# Patient Record
Sex: MALE | Race: WHITE | NOT HISPANIC OR LATINO | Employment: FULL TIME | URBAN - NONMETROPOLITAN AREA
[De-identification: names, ages, dates, MRNs, and addresses within clinical notes are randomized per-mention and may not be internally consistent; named-entity substitution may affect disease eponyms.]

---

## 2024-06-05 ENCOUNTER — APPOINTMENT (OUTPATIENT)
Dept: URGENT CARE | Facility: PHYSICIAN GROUP | Age: 44
End: 2024-06-05
Payer: COMMERCIAL

## 2024-06-12 ENCOUNTER — OFFICE VISIT (OUTPATIENT)
Dept: MEDICAL GROUP | Facility: PHYSICIAN GROUP | Age: 44
End: 2024-06-12
Payer: COMMERCIAL

## 2024-06-12 VITALS
SYSTOLIC BLOOD PRESSURE: 112 MMHG | TEMPERATURE: 98.8 F | HEART RATE: 87 BPM | HEIGHT: 72 IN | OXYGEN SATURATION: 95 % | DIASTOLIC BLOOD PRESSURE: 70 MMHG | BODY MASS INDEX: 28.31 KG/M2 | WEIGHT: 209 LBS

## 2024-06-12 DIAGNOSIS — Z86.14 HISTORY OF MRSA INFECTION: ICD-10-CM

## 2024-06-12 DIAGNOSIS — R17 HIGH BILIRUBIN: ICD-10-CM

## 2024-06-12 DIAGNOSIS — Z87.898 HISTORY OF SYNCOPE: ICD-10-CM

## 2024-06-12 DIAGNOSIS — Z76.89 ENCOUNTER TO ESTABLISH CARE: ICD-10-CM

## 2024-06-12 DIAGNOSIS — Z00.00 HEALTHCARE MAINTENANCE: ICD-10-CM

## 2024-06-12 PROCEDURE — 99203 OFFICE O/P NEW LOW 30 MIN: CPT | Performed by: NURSE PRACTITIONER

## 2024-06-12 PROCEDURE — 3078F DIAST BP <80 MM HG: CPT | Performed by: NURSE PRACTITIONER

## 2024-06-12 PROCEDURE — 3074F SYST BP LT 130 MM HG: CPT | Performed by: NURSE PRACTITIONER

## 2024-06-12 ASSESSMENT — PATIENT HEALTH QUESTIONNAIRE - PHQ9: CLINICAL INTERPRETATION OF PHQ2 SCORE: 0

## 2024-06-12 NOTE — ASSESSMENT & PLAN NOTE
He wears an iWatch with ECG. Last week went to [facility] as he passed out. He did have EKG at the hospital completed but was not sure of the finding. He can have irregular heart beat since last week when he passed out. We are requesting hospital records.

## 2024-06-12 NOTE — ASSESSMENT & PLAN NOTE
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 States his readings are \"68 \". He can have dark circles under eyes or yellow spots to hands. He has not seen gastroenterology.   "

## 2024-06-12 NOTE — ASSESSMENT & PLAN NOTE
Reports last Tdap 8 years ago. He went to [facility] last week as he passed out. He was diagnosed with right ear infection. He completed his amoxicillin. He did have blood work completed. Requesting records.

## 2024-06-12 NOTE — ASSESSMENT & PLAN NOTE
He was previously on Norco, due to MRSA infection to right elbow. This was at [facility]. Requesting records.

## 2024-06-12 NOTE — LETTER
[facility]  CANELO Bloom.  [address] [place]  [place] [address]  Fax: [phone]   Authorization for Release/Disclosure of   Protected Health Information   Name: KWADWO AGUILAR : 1980 SSN: [id number]   Address: [address] [place] [address] Phone:    There are no phone numbers on file.   I authorize the entity listed below to release/disclose the PHI below to:   [facility]/RADAMES Bloom and RADAMES Bloom   Provider or Entity Name:  [facility]   Address   [facility], [place]   [address], [place] [address]  Phone:      Fax:     Reason for request: continuity of care   Information to be released:    [  ] LAST COLONOSCOPY,  including any PATH REPORT and follow-up  [  ] LAST FIT/COLOGUARD RESULT [  ] LAST DEXA  [  ] LAST MAMMOGRAM  [  ] LAST PAP  [  ] LAST LABS [  ] RETINA EXAM REPORT  [  ] IMMUNIZATION RECORDS  [xx ] Release all info      [  ] Check here and initial the line next to each item to release ALL health information INCLUDING  _____ Care and treatment for drug and / or alcohol abuse  _____ HIV testing, infection status, or AIDS  _____ Genetic Testing    DATES OF SERVICE OR TIME PERIOD TO BE DISCLOSED: _____________  I understand and acknowledge that:  * This Authorization may be revoked at any time by you in writing, except if your health information has already been used or disclosed.  * Your health information that will be used or disclosed as a result of you signing this authorization could be re-disclosed by the recipient. If this occurs, your re-disclosed health information may no longer be protected by State or Federal laws.  * You may refuse to sign this Authorization. Your refusal will not affect your ability to obtain treatment.  * This Authorization becomes effective upon signing and will  on (date) __________.      If no date is indicated, this Authorization will  one (1) year from the  signature date.    Name: Ankush Ray  Signature: Date:   6/12/2024     PLEASE FAX REQUESTED RECORDS BACK TO: [phone]

## 2024-06-12 NOTE — PROGRESS NOTES
"Subjective:     CC:  Diagnoses of Encounter to establish care, Healthcare maintenance, High bilirubin, History of MRSA infection, and History of syncope were pertinent to this visit.    HISTORY OF THE PRESENT ILLNESS: Patient is a 44 y.o. male. This pleasant patient is here today to establish care and discuss the following. His prior PCP was [facility] in [place] 15 years ago.       Healthcare maintenance  Reports last Tdap 8 years ago. He went to [facility] last week as he passed out. He was diagnosed with right ear infection. He completed his amoxicillin. He did have blood work completed. Requesting records.     History of MRSA infection  He was previously on Norco, due to MRSA infection to right elbow. This was at [facility]. Requesting records.     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 States his readings are \"68 \". He can have dark circles under eyes or yellow spots to hands. He has not seen gastroenterology.     History of syncope  He wears an iWatch with ECG. Last week went to [facility] as he passed out. He did have EKG at the hospital completed but was not sure of the finding. He can have irregular heart beat since last week when he passed out. We are requesting hospital records.       Allergies: Patient has no known allergies.    Current Outpatient Medications Ordered in Epic   Medication Sig Dispense Refill    multivitamin Tab Take 1 Tablet by mouth every day.      Misc Natural Products (BEET ROOT PO) Take  by mouth.      NON SPECIFIED Multivitamin that helps with cognitive/thyroid. Thinks the name is Spark       No current Epic-ordered facility-administered medications on file.       History reviewed. No pertinent past medical history.    Past Surgical History:   Procedure Laterality Date    OTHER  2014    tailbone cyst removal       Social History     Tobacco Use    Smoking status: Never    Smokeless tobacco: Never   Vaping Use    Vaping " status: Never Used   Substance Use Topics    Alcohol use: Not Currently    Drug use: Yes     Frequency: 2.0 times per week     Types: Marijuana, Inhaled     Comment: on weekends       Social History     Social History Narrative    Not on file       Family History   Problem Relation Age of Onset    Other Mother         blood clot    Obesity Father     No Known Problems Sister     Asthma Brother     No Known Problems Daughter        Health Maintenance: Reviewed. Tdap was 8 years ago.        Objective:     Vital signs reviewed   Exam: /70 (BP Location: Left arm, Patient Position: Sitting, BP Cuff Size: Adult)   Pulse 87   Temp 37.1 °C (98.8 °F) (Temporal)   Ht 1.829 m (6')   Wt 94.8 kg (209 lb)   SpO2 95%  Body mass index is 28.35 kg/m².    Gen: Alert and oriented, No apparent distress.  Ears:   right ear canal with small dirt, right TM without bulging or exudate, border with erythema, left ear canal with excessive cerumen and unable to completely visualize left TM, portion that is visualized pearly gray.  Eyes:   Lids normal. Glasses in place.   Lungs: Normal effort, CTA bilaterally, no wheezes, rhonchi, or rales.    CV: Regular rate and rhythm. No murmurs, rubs, or gallops. Radial pulses 2+.        Assessment & Plan:   44 y.o. male with the following -    1. Encounter to establish care  Acute uncomplicated problem.  Care established today.  We are requesting records from his recent [facility] visit, previous PCP and hospital lab results from [facility]. Continue daily multivitamin.     2. High bilirubin  Chronic stable problem.  Awaiting records from [facility] before reordering labs.  Requesting records from previous PCP.    3. Healthcare maintenance  Acute uncomplicated problem.  Awaiting records for labs and immunizations.  Current vaccinations recommended hepatitis B and Tdap.  Due for HIV and hepatitis C screening.    4. History of syncope  Acute uncomplicated problem.  Awaiting  records from [facility].  Inform patient that cardiovascular exam does not appreciate any arrhythmias or irregular heartbeats.    5. History of MRSA infection  Acute uncomplicated problem.  Requesting records from [facility].      Return in about 4 weeks (around 7/10/2024) for follow-up records/labs.    Please note that this dictation was created using voice recognition software. I have made every reasonable attempt to correct obvious errors, but I expect that there are errors of grammar and possibly content that I did not discover before finalizing the note.

## 2024-06-12 NOTE — LETTER
[facility]  RADAMES Bloom  [address]  [place] [address]  Fax: [phone]   Authorization for Release/Disclosure of   Protected Health Information   Name: KWADWO AGUILAR : 1980 SSN: [id number]   Address: [address] [place] [id number] Phone:    There are no phone numbers on file.   I authorize the entity listed below to release/disclose the PHI below to:   [facility]/RAD[facility] Bloom and RADAMES Bloom   Provider or Entity Name:  [facility]    Address   [facility], Zip  [address] [place] [address]  Phone:  [phone]     Fax:     Reason for request: continuity of care   Information to be released:    [  ] LAST COLONOSCOPY,  including any PATH REPORT and follow-up  [  ] LAST FIT/COLOGUARD RESULT [  ] LAST DEXA  [  ] LAST MAMMOGRAM  [  ] LAST PAP  [xxx] LAST LABS [  ] RETINA EXAM REPORT  [  ] IMMUNIZATION RECORDS  [  ] Release all info      [  ] Check here and initial the line next to each item to release ALL health information INCLUDING  _____ Care and treatment for drug and / or alcohol abuse  _____ HIV testing, infection status, or AIDS  _____ Genetic Testing    DATES OF SERVICE OR TIME PERIOD TO BE DISCLOSED: _____________  I understand and acknowledge that:  * This Authorization may be revoked at any time by you in writing, except if your health information has already been used or disclosed.  * Your health information that will be used or disclosed as a result of you signing this authorization could be re-disclosed by the recipient. If this occurs, your re-disclosed health information may no longer be protected by State or Federal laws.  * You may refuse to sign this Authorization. Your refusal will not affect your ability to obtain treatment.  * This Authorization becomes effective upon signing and will  on (date) __________.      If no date is indicated, this Authorization will  one (1) year from the  signature date.    Name: Ankush Ray  Signature: Date:   6/12/2024     PLEASE FAX REQUESTED RECORDS BACK TO: [phone]

## 2024-06-12 NOTE — LETTER
[facility]  CANELO Bloom.  [address]  [facility] [address]  Fax: [phone]   Authorization for Release/Disclosure of   Protected Health Information   Name: KWADWO AGUILAR : 1980 SSN: [id number]   Address: [address] [place] [address] Phone:    There are no phone numbers on file.   I authorize the entity listed below to release/disclose the PHI below to:   [facility]/RADAMES Bloom and RADAMES Bloom   Provider or Entity Name:  [facility]    Address   City, State, Zip   Phone:      Fax:     Reason for request: continuity of care   Information to be released:    [  ] LAST COLONOSCOPY,  including any PATH REPORT and follow-up  [  ] LAST FIT/COLOGUARD RESULT [  ] LAST DEXA  [  ] LAST MAMMOGRAM  [  ] LAST PAP  [  ] LAST LABS [  ] RETINA EXAM REPORT  [  ] IMMUNIZATION RECORDS  [ xxx] Release all info from last ER visit 24      [  ] Check here and initial the line next to each item to release ALL health information INCLUDING  _____ Care and treatment for drug and / or alcohol abuse  _____ HIV testing, infection status, or AIDS  _____ Genetic Testing    DATES OF SERVICE OR TIME PERIOD TO BE DISCLOSED: _____________  I understand and acknowledge that:  * This Authorization may be revoked at any time by you in writing, except if your health information has already been used or disclosed.  * Your health information that will be used or disclosed as a result of you signing this authorization could be re-disclosed by the recipient. If this occurs, your re-disclosed health information may no longer be protected by State or Federal laws.  * You may refuse to sign this Authorization. Your refusal will not affect your ability to obtain treatment.  * This Authorization becomes effective upon signing and will  on (date) __________.      If no date is indicated, this Authorization will  one (1) year from the signature date.    Name: Kwadwo  Flor  Signature: Date:   6/12/2024     PLEASE FAX REQUESTED RECORDS BACK TO: [phone]

## 2024-07-17 ENCOUNTER — HOSPITAL ENCOUNTER (OUTPATIENT)
Dept: RADIOLOGY | Facility: MEDICAL CENTER | Age: 44
End: 2024-07-17
Attending: NURSE PRACTITIONER
Payer: COMMERCIAL

## 2024-07-17 ENCOUNTER — OFFICE VISIT (OUTPATIENT)
Dept: MEDICAL GROUP | Facility: PHYSICIAN GROUP | Age: 44
End: 2024-07-17
Payer: COMMERCIAL

## 2024-07-17 VITALS
HEIGHT: 72 IN | SYSTOLIC BLOOD PRESSURE: 108 MMHG | DIASTOLIC BLOOD PRESSURE: 60 MMHG | WEIGHT: 212 LBS | OXYGEN SATURATION: 96 % | HEART RATE: 63 BPM | TEMPERATURE: 98.8 F | BODY MASS INDEX: 28.71 KG/M2

## 2024-07-17 DIAGNOSIS — M25.541 ARTHRALGIA OF RIGHT HAND: ICD-10-CM

## 2024-07-17 DIAGNOSIS — H61.22 EXCESSIVE CERUMEN IN LEFT EAR CANAL: ICD-10-CM

## 2024-07-17 DIAGNOSIS — L30.9 DERMATITIS: ICD-10-CM

## 2024-07-17 DIAGNOSIS — L98.9 SKIN LESIONS: ICD-10-CM

## 2024-07-17 DIAGNOSIS — L29.9 PRURITUS: ICD-10-CM

## 2024-07-17 DIAGNOSIS — R09.82 POST-NASAL DRIP: ICD-10-CM

## 2024-07-17 PROCEDURE — 3074F SYST BP LT 130 MM HG: CPT | Performed by: NURSE PRACTITIONER

## 2024-07-17 PROCEDURE — 99215 OFFICE O/P EST HI 40 MIN: CPT | Mod: 25 | Performed by: NURSE PRACTITIONER

## 2024-07-17 PROCEDURE — 3078F DIAST BP <80 MM HG: CPT | Performed by: NURSE PRACTITIONER

## 2024-07-17 PROCEDURE — 69210 REMOVE IMPACTED EAR WAX UNI: CPT | Performed by: NURSE PRACTITIONER

## 2024-07-17 PROCEDURE — 73130 X-RAY EXAM OF HAND: CPT | Mod: RT

## 2024-07-29 ENCOUNTER — OFFICE VISIT (OUTPATIENT)
Dept: MEDICAL GROUP | Facility: PHYSICIAN GROUP | Age: 44
End: 2024-07-29
Payer: COMMERCIAL

## 2024-07-29 VITALS
OXYGEN SATURATION: 97 % | HEIGHT: 72 IN | BODY MASS INDEX: 28.58 KG/M2 | SYSTOLIC BLOOD PRESSURE: 100 MMHG | TEMPERATURE: 98.7 F | HEART RATE: 82 BPM | WEIGHT: 211 LBS | DIASTOLIC BLOOD PRESSURE: 60 MMHG

## 2024-07-29 DIAGNOSIS — Z23 NEED FOR VACCINATION: ICD-10-CM

## 2024-07-29 DIAGNOSIS — Z13.220 SCREENING FOR LIPID DISORDERS: ICD-10-CM

## 2024-07-29 DIAGNOSIS — Z11.59 NEED FOR HEPATITIS C SCREENING TEST: ICD-10-CM

## 2024-07-29 DIAGNOSIS — R68.82 LOW LIBIDO: ICD-10-CM

## 2024-07-29 DIAGNOSIS — R09.82 POSTNASAL DRIP: ICD-10-CM

## 2024-07-29 DIAGNOSIS — Z01.84 IMMUNITY STATUS TESTING: ICD-10-CM

## 2024-07-29 DIAGNOSIS — Z11.4 SCREENING FOR HIV (HUMAN IMMUNODEFICIENCY VIRUS): ICD-10-CM

## 2024-07-29 RX ORDER — IBUPROFEN 200 MG
200 TABLET ORAL EVERY 6 HOURS PRN
COMMUNITY

## 2024-07-29 RX ORDER — LORATADINE 10 MG/1
10 TABLET ORAL DAILY
COMMUNITY

## 2024-07-31 ENCOUNTER — HOSPITAL ENCOUNTER (OUTPATIENT)
Dept: LAB | Facility: MEDICAL CENTER | Age: 44
End: 2024-07-31
Attending: NURSE PRACTITIONER
Payer: COMMERCIAL

## 2024-07-31 DIAGNOSIS — Z11.4 SCREENING FOR HIV (HUMAN IMMUNODEFICIENCY VIRUS): ICD-10-CM

## 2024-07-31 DIAGNOSIS — Z11.59 NEED FOR HEPATITIS C SCREENING TEST: ICD-10-CM

## 2024-07-31 DIAGNOSIS — Z01.84 IMMUNITY STATUS TESTING: ICD-10-CM

## 2024-07-31 DIAGNOSIS — R68.82 LOW LIBIDO: ICD-10-CM

## 2024-07-31 DIAGNOSIS — Z13.220 SCREENING FOR LIPID DISORDERS: ICD-10-CM

## 2024-07-31 LAB
CHOLEST SERPL-MCNC: 138 MG/DL (ref 100–199)
EST. AVERAGE GLUCOSE BLD GHB EST-MCNC: 103 MG/DL
FASTING STATUS PATIENT QL REPORTED: NORMAL
HBA1C MFR BLD: 5.2 % (ref 4–5.6)
HBV SURFACE AB SERPL IA-ACNC: <3.5 MIU/ML (ref 0–10)
HCV AB SER QL: NORMAL
HDLC SERPL-MCNC: 48 MG/DL
HIV 1+2 AB+HIV1 P24 AG SERPL QL IA: NORMAL
LDLC SERPL CALC-MCNC: 78 MG/DL
PROLACTIN SERPL-MCNC: 8.26 NG/ML (ref 2.1–17.7)
TRIGL SERPL-MCNC: 61 MG/DL (ref 0–149)
TSH SERPL DL<=0.005 MIU/L-ACNC: 3.26 UIU/ML (ref 0.38–5.33)

## 2024-07-31 PROCEDURE — 80061 LIPID PANEL: CPT

## 2024-07-31 PROCEDURE — 84443 ASSAY THYROID STIM HORMONE: CPT

## 2024-07-31 PROCEDURE — 84146 ASSAY OF PROLACTIN: CPT

## 2024-07-31 PROCEDURE — 83036 HEMOGLOBIN GLYCOSYLATED A1C: CPT

## 2024-07-31 PROCEDURE — 36415 COLL VENOUS BLD VENIPUNCTURE: CPT

## 2024-07-31 PROCEDURE — 84270 ASSAY OF SEX HORMONE GLOBUL: CPT

## 2024-07-31 PROCEDURE — 84403 ASSAY OF TOTAL TESTOSTERONE: CPT

## 2024-07-31 PROCEDURE — 84402 ASSAY OF FREE TESTOSTERONE: CPT

## 2024-07-31 PROCEDURE — 86803 HEPATITIS C AB TEST: CPT

## 2024-07-31 PROCEDURE — 87389 HIV-1 AG W/HIV-1&-2 AB AG IA: CPT

## 2024-07-31 PROCEDURE — 86706 HEP B SURFACE ANTIBODY: CPT

## 2024-08-02 LAB
SHBG SERPL-SCNC: 21 NMOL/L (ref 17–56)
TESTOST FREE MFR SERPL: 2.2 % (ref 1.6–2.9)
TESTOST FREE SERPL-MCNC: 62 PG/ML (ref 47–244)
TESTOST SERPL-MCNC: 282 NG/DL (ref 300–890)

## 2024-08-05 DIAGNOSIS — R68.82 LOW LIBIDO: ICD-10-CM

## 2024-08-05 DIAGNOSIS — R79.89 LOW TESTOSTERONE IN MALE: ICD-10-CM

## 2024-08-14 ENCOUNTER — HOSPITAL ENCOUNTER (OUTPATIENT)
Dept: LAB | Facility: MEDICAL CENTER | Age: 44
End: 2024-08-14
Attending: NURSE PRACTITIONER
Payer: COMMERCIAL

## 2024-08-14 DIAGNOSIS — R68.82 LOW LIBIDO: ICD-10-CM

## 2024-08-14 DIAGNOSIS — R79.89 LOW TESTOSTERONE IN MALE: ICD-10-CM

## 2024-08-14 PROCEDURE — 84270 ASSAY OF SEX HORMONE GLOBUL: CPT

## 2024-08-14 PROCEDURE — 84403 ASSAY OF TOTAL TESTOSTERONE: CPT

## 2024-08-14 PROCEDURE — 84402 ASSAY OF FREE TESTOSTERONE: CPT

## 2024-08-14 PROCEDURE — 36415 COLL VENOUS BLD VENIPUNCTURE: CPT

## 2024-08-19 LAB
SHBG SERPL-SCNC: 20 NMOL/L (ref 17–56)
TESTOST FREE MFR SERPL: 2.2 % (ref 1.6–2.9)
TESTOST FREE SERPL-MCNC: 59 PG/ML (ref 47–244)
TESTOST SERPL-MCNC: 262 NG/DL (ref 300–890)

## 2024-08-21 ENCOUNTER — OFFICE VISIT (OUTPATIENT)
Dept: MEDICAL GROUP | Facility: PHYSICIAN GROUP | Age: 44
End: 2024-08-21
Payer: COMMERCIAL

## 2024-08-21 VITALS
TEMPERATURE: 98.6 F | HEART RATE: 79 BPM | HEIGHT: 72 IN | DIASTOLIC BLOOD PRESSURE: 70 MMHG | WEIGHT: 211 LBS | OXYGEN SATURATION: 97 % | BODY MASS INDEX: 28.58 KG/M2 | SYSTOLIC BLOOD PRESSURE: 110 MMHG

## 2024-08-21 DIAGNOSIS — Z71.2 ENCOUNTER TO DISCUSS TEST RESULTS: ICD-10-CM

## 2024-08-21 DIAGNOSIS — R68.82 LOW LIBIDO: ICD-10-CM

## 2024-08-21 DIAGNOSIS — Z23 NEED FOR VACCINATION: ICD-10-CM

## 2024-08-21 DIAGNOSIS — R79.89 LOW SERUM TESTOSTERONE LEVEL IN MALE: ICD-10-CM

## 2024-08-21 PROCEDURE — 90746 HEPB VACCINE 3 DOSE ADULT IM: CPT | Performed by: NURSE PRACTITIONER

## 2024-08-21 PROCEDURE — 3078F DIAST BP <80 MM HG: CPT | Performed by: NURSE PRACTITIONER

## 2024-08-21 PROCEDURE — 3074F SYST BP LT 130 MM HG: CPT | Performed by: NURSE PRACTITIONER

## 2024-08-21 PROCEDURE — 99213 OFFICE O/P EST LOW 20 MIN: CPT | Mod: 25 | Performed by: NURSE PRACTITIONER

## 2024-08-21 PROCEDURE — 90471 IMMUNIZATION ADMIN: CPT | Performed by: NURSE PRACTITIONER

## 2024-08-21 NOTE — PROGRESS NOTES
Subjective:     CC: lab results     HPI:   Ankush presents today with the following:    Low serum testosterone level in male  Initial testosterone level 282 and on repeat level 262. With his low libido recommend testosterone replacement consultation with urology. He is eating ancient grains and oatmeal.     History reviewed. No pertinent past medical history.    Social History     Tobacco Use    Smoking status: Never    Smokeless tobacco: Never   Vaping Use    Vaping status: Never Used   Substance Use Topics    Alcohol use: Not Currently    Drug use: Yes     Frequency: 2.0 times per week     Types: Marijuana, Inhaled     Comment: on weekends       Current Outpatient Medications Ordered in Epic   Medication Sig Dispense Refill    loratadine (CLARITIN) 10 MG Tab Take 10 mg by mouth every day.      ibuprofen (MOTRIN) 200 MG Tab Take 200 mg by mouth every 6 hours as needed.      multivitamin Tab Take 1 Tablet by mouth every day.       No current [facility]-ordered facility-administered medications on file.       Allergies:  Patient has no known allergies.    Health Maintenance: starting Hep B series      Objective:     Vital signs reviewed  Exam:  /70 (BP Location: Right arm, Patient Position: Sitting, BP Cuff Size: Adult)   Pulse 79   Temp 37 °C (98.6 °F) (Temporal)   Ht 1.829 m (6')   Wt 95.7 kg (211 lb)   SpO2 97%   BMI 28.62 kg/m²  Body mass index is 28.62 kg/m².    Gen: Alert and oriented, No apparent distress.  Neck: Neck is supple, full ROM.  Lungs: Normal effort, no audible wheezes  CV: Skin pink, warm and dry.  Ext: No clubbing, cyanosis, edema.      Assessment & Plan:     44 y.o. male with the following -     1. Encounter to discuss test results  Acute uncomplicated problem.  Discussed and reviewed lab results from 7/21/2024 and 8/14/2024.    2. Low serum testosterone level in male  Acute uncomplicated problem.  New problem to examiner.  He has had to repeat levels with low testosterone.  With his low  libido and fatigue recommend consultation with urology for testosterone replacement.  Referral placed today.  - Referral to Urology    3. Low libido  Chronic exacerbated problem.  States that he has not heard back from his friend but believes it may have been low testosterone not low estrogen.  Recent labs show testosterone levels were low x 2.  Referral to urology.  - Referral to Urology    4. Need for vaccination  Acute uncomplicated problem.  Recent labs did not show immunity to hepatitis B.  He is agreeable to starting 3 dose vaccine series. I have placed the below orders and discussed them with an approved delegating provider. The MA is performing the below orders under the direction of Dr. Hernandez.  - Hep B Adult 20+        Return if symptoms worsen or fail to improve.    Please note that this dictation was created using voice recognition software. I have made every reasonable attempt to correct obvious errors, but I expect that there are errors of grammar and possibly content that I did not discover before finalizing the note.

## 2024-08-21 NOTE — ASSESSMENT & PLAN NOTE
Initial testosterone level 282 and on repeat level 262. With his low libido recommend testosterone replacement consultation with urology. He is eating ancient grains and oatmeal.

## 2024-09-18 ENCOUNTER — NON-PROVIDER VISIT (OUTPATIENT)
Dept: MEDICAL GROUP | Facility: PHYSICIAN GROUP | Age: 44
End: 2024-09-18
Payer: COMMERCIAL

## 2024-09-18 DIAGNOSIS — Z23 NEED FOR VACCINATION: ICD-10-CM

## 2024-09-18 PROCEDURE — 90746 HEPB VACCINE 3 DOSE ADULT IM: CPT | Mod: JZ | Performed by: NURSE PRACTITIONER

## 2024-09-18 PROCEDURE — 90471 IMMUNIZATION ADMIN: CPT | Performed by: NURSE PRACTITIONER

## 2024-09-19 NOTE — PROGRESS NOTES
"Ankush Ray is a 44 y.o. male here for a non-provider visit for:   HEPATITIS B 2 of 3    Reason for immunization: continue or complete series started at the office  Immunization records indicate need for vaccine: Yes, confirmed with Epic  Minimum interval has been met for this vaccine: Yes  ABN completed: Not Indicated    VIS Dated  5/12/23 was given to patient: Yes  All IAC Questionnaire questions were answered \"No.\"    Patient tolerated injection and no adverse effects were observed or reported: Yes    Pt scheduled for next dose in series: Yes  "

## 2024-09-25 ENCOUNTER — OFFICE VISIT (OUTPATIENT)
Dept: UROLOGY | Facility: MEDICAL CENTER | Age: 44
End: 2024-09-25
Payer: COMMERCIAL

## 2024-09-25 VITALS
HEART RATE: 62 BPM | BODY MASS INDEX: 28.58 KG/M2 | WEIGHT: 211 LBS | DIASTOLIC BLOOD PRESSURE: 83 MMHG | OXYGEN SATURATION: 97 % | SYSTOLIC BLOOD PRESSURE: 140 MMHG | HEIGHT: 72 IN

## 2024-09-25 DIAGNOSIS — R79.89 LOW SERUM TESTOSTERONE LEVEL IN MALE: ICD-10-CM

## 2024-09-25 NOTE — PROGRESS NOTES
Subjective  Ankush Ray is a 44 y.o. male who presents today for evaluation of low testosterone.    Associated Symptoms: decreased libido and erectile dysfunction    The patient does not wish to preserve future fertility.      Therapies tried:  amino acid supplementation    MELLISA score: 15, mild to moderate ED     Exposure History:no exposure to chemotherapy, testicular radiation, chronic narcotic use, chronic corticosteroid use, and chronic marijuana use      Family History   Problem Relation Age of Onset    Other Mother         blood clot    Obesity Father     No Known Problems Sister     Asthma Brother     No Known Problems Daughter        Social History     Socioeconomic History    Marital status:      Spouse name: Not on file    Number of children: Not on file    Years of education: Not on file    Highest education level: Not on file   Occupational History    Not on file   Tobacco Use    Smoking status: Never    Smokeless tobacco: Never   Vaping Use    Vaping status: Never Used   Substance and Sexual Activity    Alcohol use: Not Currently    Drug use: Yes     Frequency: 2.0 times per week     Types: Marijuana, Inhaled     Comment: on weekends    Sexual activity: Yes     Partners: Female     Comment: wife-ablation   Other Topics Concern    Not on file   Social History Narrative    Not on file     Social Determinants of Health     Financial Resource Strain: Not on file   Food Insecurity: Not on file   Transportation Needs: Not on file   Physical Activity: Not on file   Stress: Not on file   Social Connections: Not on file   Intimate Partner Violence: Not on file   Housing Stability: Not on file       Past Surgical History:   Procedure Laterality Date    OTHER  2014    tailbone cyst removal       No past medical history on file.    Current Outpatient Medications   Medication Sig Dispense Refill    loratadine (CLARITIN) 10 MG Tab Take 10 mg by mouth every day.      ibuprofen (MOTRIN) 200 MG Tab Take 200 mg by  "mouth every 6 hours as needed.      multivitamin Tab Take 1 Tablet by mouth every day.       No current facility-administered medications for this visit.       No Known Allergies    Objective  BP (!) 140/83 (BP Location: Left arm, Patient Position: Sitting, BP Cuff Size: Adult)   Pulse 62   Ht 1.829 m (6' 0.01\")   Wt 95.7 kg (211 lb)   SpO2 97%   Physical Exam  Constitutional:       Appearance: Normal appearance.   HENT:      Head: Normocephalic and atraumatic.   Pulmonary:      Effort: Pulmonary effort is normal.   Skin:     General: Skin is warm and dry.   Neurological:      General: No focal deficit present.      Mental Status: He is alert.   Psychiatric:         Mood and Affect: Mood normal.         Behavior: Behavior normal.         Labs:     Lab Results   Component Value Date/Time    TESTOSTERONE 262 (L) 08/14/2024 0828    FREETESTOST 59 08/14/2024 0828    SEXHORM 20 08/14/2024 0828     Prolactin 7/31/2024: 8.26    Imaging: none    Assessment    I reviewed the common causes of hypogonadism. We will obtain an early morning serum LH, FSH, estradiol, and testosterone. We discussed the need for a CBC, and PSA (in patient’s >40 years of age) if the initial testosterone level is low in preparation for starting testosterone replacement therapy. Low testosterone is defined as two early morning fasting measurements <300 ng/dL.      We discussed lifestyle modifications including moderate exercise, weight loss, healthy diet, and adequate sleep, as a way to improve endogenous testosterone production.     We discussed various formulations of TRT, including topical gel/cream/patch, intranasal, IM injectable, and long-acting depo or pellets. We discussed the off-label option of oral clomiphene citrate for patient’s wishing to preserve future fertility. We discussed side effects, risks and benefits of each. Our goal with therapy is to achieve a testosterone level of 400-600 ng/dL.      Testosterone replacement can result " in improvements in erectile function, low libido, anemia, bone mineral density, muscle mass, and/or depressive symptoms. Data is inconclusive on whether testosterone replacement improves cognitive function, energy, fatigue, and measures of diabetes.     We reviewed the common risks following testosterone replacement, including cholesterol imbalance, polycythemia, and infertility. There is no evidence supporting that testosterone replacement leads to the development of prostate cancer or DVT/PE or increases the risk of cardiovascular events (MI, stroke, etc), however, low testosterone is a risk factor for cardiovascular disease.       He understands these risks and wishes to proceed if levels are confirmed to be low.  He understands he is to adhere to a strict follow-up for proper monitoring. Based on the the above, he wishes to start treatment with patch testosterone replacement if his levels are confirmed to be low. He has a small child at home and it is recommend to avoid the gel as there is possible transference. He has a hard time with needles and doesn't think he could give himself an injection.      Plan    Problem List Items Addressed This Visit       Low serum testosterone level in male    Relevant Orders    TESTOSTERONE SERUM    FSH/LH    CBC WITH DIFFERENTIAL    PROSTATE SPECIFIC AG SCREENING    ESTRADIOL     Hormone panel, CBC, and PSA ordered  RTC 2 months  Will start supplementation with the patch pending lab results

## 2024-09-27 ENCOUNTER — HOSPITAL ENCOUNTER (OUTPATIENT)
Dept: LAB | Facility: MEDICAL CENTER | Age: 44
End: 2024-09-27
Attending: STUDENT IN AN ORGANIZED HEALTH CARE EDUCATION/TRAINING PROGRAM
Payer: COMMERCIAL

## 2024-09-27 DIAGNOSIS — R79.89 LOW SERUM TESTOSTERONE LEVEL IN MALE: ICD-10-CM

## 2024-09-27 LAB
BASOPHILS # BLD AUTO: 0.8 % (ref 0–1.8)
BASOPHILS # BLD: 0.08 K/UL (ref 0–0.12)
EOSINOPHIL # BLD AUTO: 0.3 K/UL (ref 0–0.51)
EOSINOPHIL NFR BLD: 3 % (ref 0–6.9)
ERYTHROCYTE [DISTWIDTH] IN BLOOD BY AUTOMATED COUNT: 41.1 FL (ref 35.9–50)
HCT VFR BLD AUTO: 46.8 % (ref 42–52)
HGB BLD-MCNC: 15.8 G/DL (ref 14–18)
IMM GRANULOCYTES # BLD AUTO: 0.06 K/UL (ref 0–0.11)
IMM GRANULOCYTES NFR BLD AUTO: 0.6 % (ref 0–0.9)
LYMPHOCYTES # BLD AUTO: 2.14 K/UL (ref 1–4.8)
LYMPHOCYTES NFR BLD: 21.1 % (ref 22–41)
MCH RBC QN AUTO: 30.9 PG (ref 27–33)
MCHC RBC AUTO-ENTMCNC: 33.8 G/DL (ref 32.3–36.5)
MCV RBC AUTO: 91.4 FL (ref 81.4–97.8)
MONOCYTES # BLD AUTO: 0.76 K/UL (ref 0–0.85)
MONOCYTES NFR BLD AUTO: 7.5 % (ref 0–13.4)
NEUTROPHILS # BLD AUTO: 6.79 K/UL (ref 1.82–7.42)
NEUTROPHILS NFR BLD: 67 % (ref 44–72)
NRBC # BLD AUTO: 0 K/UL
NRBC BLD-RTO: 0 /100 WBC (ref 0–0.2)
PLATELET # BLD AUTO: 205 K/UL (ref 164–446)
PMV BLD AUTO: 9.9 FL (ref 9–12.9)
RBC # BLD AUTO: 5.12 M/UL (ref 4.7–6.1)
WBC # BLD AUTO: 10.1 K/UL (ref 4.8–10.8)

## 2024-09-27 PROCEDURE — 85025 COMPLETE CBC W/AUTO DIFF WBC: CPT

## 2024-09-27 PROCEDURE — 84403 ASSAY OF TOTAL TESTOSTERONE: CPT

## 2024-09-27 PROCEDURE — 36415 COLL VENOUS BLD VENIPUNCTURE: CPT

## 2024-09-27 PROCEDURE — 84153 ASSAY OF PSA TOTAL: CPT

## 2024-09-27 PROCEDURE — 82670 ASSAY OF TOTAL ESTRADIOL: CPT

## 2024-09-27 PROCEDURE — 83002 ASSAY OF GONADOTROPIN (LH): CPT

## 2024-09-27 PROCEDURE — 83001 ASSAY OF GONADOTROPIN (FSH): CPT

## 2024-09-28 LAB
ESTRADIOL SERPL-MCNC: 16.6 PG/ML
FSH SERPL-ACNC: 4.1 MIU/ML (ref 1.5–12.4)
LH SERPL-ACNC: 3.5 IU/L (ref 1.7–8.6)
PSA SERPL-MCNC: 0.54 NG/ML (ref 0–4)
TESTOST SERPL-MCNC: 273 NG/DL (ref 175–781)

## 2024-10-07 DIAGNOSIS — R79.89 LOW TESTOSTERONE: ICD-10-CM

## 2024-10-14 DIAGNOSIS — R79.89 LOW TESTOSTERONE: ICD-10-CM

## 2024-10-14 RX ORDER — TESTOSTERONE 1.62 MG/G
20.25 GEL TRANSDERMAL DAILY
Qty: 75 G | Refills: 2 | Status: SHIPPED | OUTPATIENT
Start: 2024-10-14 | End: 2025-02-11

## 2024-11-25 ENCOUNTER — HOSPITAL ENCOUNTER (OUTPATIENT)
Dept: LAB | Facility: MEDICAL CENTER | Age: 44
End: 2024-11-25
Attending: STUDENT IN AN ORGANIZED HEALTH CARE EDUCATION/TRAINING PROGRAM
Payer: COMMERCIAL

## 2024-11-25 DIAGNOSIS — R79.89 LOW TESTOSTERONE: ICD-10-CM

## 2024-11-25 LAB
BASOPHILS # BLD AUTO: 0.5 % (ref 0–1.8)
BASOPHILS # BLD: 0.04 K/UL (ref 0–0.12)
EOSINOPHIL # BLD AUTO: 0.3 K/UL (ref 0–0.51)
EOSINOPHIL NFR BLD: 3.4 % (ref 0–6.9)
ERYTHROCYTE [DISTWIDTH] IN BLOOD BY AUTOMATED COUNT: 42.1 FL (ref 35.9–50)
HCT VFR BLD AUTO: 45.3 % (ref 42–52)
HGB BLD-MCNC: 15.3 G/DL (ref 14–18)
IMM GRANULOCYTES # BLD AUTO: 0.02 K/UL (ref 0–0.11)
IMM GRANULOCYTES NFR BLD AUTO: 0.2 % (ref 0–0.9)
LYMPHOCYTES # BLD AUTO: 2.35 K/UL (ref 1–4.8)
LYMPHOCYTES NFR BLD: 26.9 % (ref 22–41)
MCH RBC QN AUTO: 31 PG (ref 27–33)
MCHC RBC AUTO-ENTMCNC: 33.8 G/DL (ref 32.3–36.5)
MCV RBC AUTO: 91.7 FL (ref 81.4–97.8)
MONOCYTES # BLD AUTO: 0.65 K/UL (ref 0–0.85)
MONOCYTES NFR BLD AUTO: 7.4 % (ref 0–13.4)
NEUTROPHILS # BLD AUTO: 5.38 K/UL (ref 1.82–7.42)
NEUTROPHILS NFR BLD: 61.6 % (ref 44–72)
NRBC # BLD AUTO: 0 K/UL
NRBC BLD-RTO: 0 /100 WBC (ref 0–0.2)
PLATELET # BLD AUTO: 199 K/UL (ref 164–446)
PMV BLD AUTO: 9.7 FL (ref 9–12.9)
RBC # BLD AUTO: 4.94 M/UL (ref 4.7–6.1)
TESTOST SERPL-MCNC: 261 NG/DL (ref 175–781)
WBC # BLD AUTO: 8.7 K/UL (ref 4.8–10.8)

## 2024-11-25 PROCEDURE — 84403 ASSAY OF TOTAL TESTOSTERONE: CPT

## 2024-11-25 PROCEDURE — 85025 COMPLETE CBC W/AUTO DIFF WBC: CPT

## 2024-11-25 PROCEDURE — 36415 COLL VENOUS BLD VENIPUNCTURE: CPT

## 2024-12-02 DIAGNOSIS — R79.89 LOW TESTOSTERONE: ICD-10-CM

## 2024-12-02 RX ORDER — TESTOSTERONE 1.62 MG/G
20.25 GEL TRANSDERMAL DAILY
Qty: 75 G | Refills: 2 | Status: SHIPPED | OUTPATIENT
Start: 2024-12-02 | End: 2024-12-09

## 2024-12-02 NOTE — TELEPHONE ENCOUNTER
Received request via: Patient    Was the patient seen in the last year in this department? Yes    Does the patient have an active prescription (recently filled or refills available) for medication(s) requested? No    Pharmacy Name:  [facility] [address] [place] [address]     Does the patient have skilled nursing Plus and need 100-day supply? (This applies to ALL medications) Patient does not have SCP

## 2024-12-09 ENCOUNTER — OFFICE VISIT (OUTPATIENT)
Dept: UROLOGY | Facility: MEDICAL CENTER | Age: 44
End: 2024-12-09
Payer: COMMERCIAL

## 2024-12-09 DIAGNOSIS — R79.89 LOW TESTOSTERONE: ICD-10-CM

## 2024-12-09 PROCEDURE — 99213 OFFICE O/P EST LOW 20 MIN: CPT | Performed by: STUDENT IN AN ORGANIZED HEALTH CARE EDUCATION/TRAINING PROGRAM

## 2024-12-09 RX ORDER — TESTOSTERONE 1.62 MG/G
40.5 GEL TRANSDERMAL DAILY
Qty: 75 G | Refills: 1 | Status: SHIPPED | OUTPATIENT
Start: 2024-12-09 | End: 2025-02-07

## 2024-12-09 NOTE — PROGRESS NOTES
Subjective  Ankush Ray is a 44 y.o. male who presents today for evaluation of low testosterone.    Associated Symptoms: decreased libido and erectile dysfunction    The patient does not wish to preserve future fertility.      Therapies tried:  amino acid supplementation    MELLISA score: 15, mild to moderate ED     Exposure History:no exposure to chemotherapy, testicular radiation, chronic narcotic use, chronic corticosteroid use, and chronic marijuana use    Interval Updates:    12/9/2024: He has noticed improvement in his energy and libido. He has also lost some weight recently. No noticeable side effects from testosterone replacement.    Family History   Problem Relation Age of Onset    Other Mother         blood clot    Obesity Father     No Known Problems Sister     Asthma Brother     No Known Problems Daughter        Social History     Socioeconomic History    Marital status:      Spouse name: Not on file    Number of children: Not on file    Years of education: Not on file    Highest education level: Not on file   Occupational History    Not on file   Tobacco Use    Smoking status: Never    Smokeless tobacco: Never   Vaping Use    Vaping status: Never Used   Substance and Sexual Activity    Alcohol use: Not Currently    Drug use: Yes     Frequency: 2.0 times per week     Types: Marijuana, Inhaled     Comment: on weekends    Sexual activity: Yes     Partners: Female     Comment: wife-ablation   Other Topics Concern    Not on file   Social History Narrative    Not on file     Social Drivers of Health     Financial Resource Strain: Not on file   Food Insecurity: Not on file   Transportation Needs: Not on file   Physical Activity: Not on file   Stress: Not on file   Social Connections: Not on file   Intimate Partner Violence: Not on file   Housing Stability: Not on file       Past Surgical History:   Procedure Laterality Date    OTHER  2014    tailbone cyst removal       No past medical history on  file.    Current Outpatient Medications   Medication Sig Dispense Refill    Testosterone (ANDROGEL) 20.25 MG/ACT (1.62%) Gel Place 20.25 mg on the skin every day for 120 days. 75 g 2    loratadine (CLARITIN) 10 MG Tab Take 10 mg by mouth every day.      ibuprofen (MOTRIN) 200 MG Tab Take 200 mg by mouth every 6 hours as needed.      multivitamin Tab Take 1 Tablet by mouth every day.       No current facility-administered medications for this visit.       No Known Allergies    Objective  There were no vitals taken for this visit.  Physical Exam  Constitutional:       Appearance: Normal appearance.   HENT:      Head: Normocephalic and atraumatic.   Pulmonary:      Effort: Pulmonary effort is normal.   Skin:     General: Skin is warm and dry.   Neurological:      General: No focal deficit present.      Mental Status: He is alert.   Psychiatric:         Mood and Affect: Mood normal.         Behavior: Behavior normal.         Labs:     Lab Results   Component Value Date/Time    FSH 4.1 09/27/2024 0820    TESTOSTERONE 261 11/25/2024 0826    FREETESTOST 59 08/14/2024 0828    SEXHORM 20 08/14/2024 0828       Imaging: none    Assessment    Mr. Ray is a 44-year-old gentleman with low testosterone currently on AndroGel testosterone replacement.  Most recent lab work completed on 11/25/2024 demonstrated a testosterone of 261.  Hematocrit was within normal limits.  We discussed that his testosterone level is still below the level of the treatment target.  I recommend increasing the dose from 1 pump per day to 2 pumps per day with repeat blood work in a couple of months and follow-up at that time.  Currently he is doing well with some improvement of what initially brought him in for supplementation and no noticeable side effects.    Plan    Problem List Items Addressed This Visit    None      Testosterone and CBC in 2-3 months  RTC 3 months  Increase dose of androgel to 2 pumps per day

## 2024-12-11 ENCOUNTER — TELEPHONE (OUTPATIENT)
Dept: UROLOGY | Facility: MEDICAL CENTER | Age: 44
End: 2024-12-11
Payer: COMMERCIAL

## 2024-12-11 NOTE — TELEPHONE ENCOUNTER
DOCUMENTATION OF PAR STATUS:    1. Name of Medication & Dose: Testosterone Gel Pump 1.62%    2. Name of Prescription Coverage Company & phone #: [employer]    3. Date Prior Auth Submitted: 12/11/24    4. What information was given to obtain insurance decision? Office notes, testosterone levels     5. Prior Auth Status? Pending-Cover My Meds     6. Patient Notified: no

## 2024-12-13 NOTE — TELEPHONE ENCOUNTER
FINAL PRIOR AUTHORIZATION STATUS:    1.  Name of Medication & Dose: Tadalafil 5 mg     2. Prior Auth Status: Approved through 12/11/2025     3. Action Taken: Pharmacy Notified: yes Patient Notified: yes

## 2025-01-09 ENCOUNTER — APPOINTMENT (OUTPATIENT)
Dept: MEDICAL GROUP | Facility: PHYSICIAN GROUP | Age: 45
End: 2025-01-09
Payer: COMMERCIAL

## 2025-02-10 ENCOUNTER — APPOINTMENT (OUTPATIENT)
Dept: MEDICAL GROUP | Facility: PHYSICIAN GROUP | Age: 45
End: 2025-02-10
Payer: COMMERCIAL

## 2025-02-21 ENCOUNTER — HOSPITAL ENCOUNTER (OUTPATIENT)
Dept: LAB | Facility: MEDICAL CENTER | Age: 45
End: 2025-02-21
Attending: STUDENT IN AN ORGANIZED HEALTH CARE EDUCATION/TRAINING PROGRAM
Payer: COMMERCIAL

## 2025-02-21 DIAGNOSIS — R79.89 LOW TESTOSTERONE: ICD-10-CM

## 2025-02-21 LAB
BASOPHILS # BLD AUTO: 0.6 % (ref 0–1.8)
BASOPHILS # BLD: 0.05 K/UL (ref 0–0.12)
EOSINOPHIL # BLD AUTO: 0.25 K/UL (ref 0–0.51)
EOSINOPHIL NFR BLD: 3 % (ref 0–6.9)
ERYTHROCYTE [DISTWIDTH] IN BLOOD BY AUTOMATED COUNT: 43 FL (ref 35.9–50)
HCT VFR BLD AUTO: 45.7 % (ref 42–52)
HGB BLD-MCNC: 15.3 G/DL (ref 14–18)
IMM GRANULOCYTES # BLD AUTO: 0.01 K/UL (ref 0–0.11)
IMM GRANULOCYTES NFR BLD AUTO: 0.1 % (ref 0–0.9)
LYMPHOCYTES # BLD AUTO: 2.06 K/UL (ref 1–4.8)
LYMPHOCYTES NFR BLD: 24.4 % (ref 22–41)
MCH RBC QN AUTO: 31.5 PG (ref 27–33)
MCHC RBC AUTO-ENTMCNC: 33.5 G/DL (ref 32.3–36.5)
MCV RBC AUTO: 94 FL (ref 81.4–97.8)
MONOCYTES # BLD AUTO: 0.67 K/UL (ref 0–0.85)
MONOCYTES NFR BLD AUTO: 7.9 % (ref 0–13.4)
NEUTROPHILS # BLD AUTO: 5.41 K/UL (ref 1.82–7.42)
NEUTROPHILS NFR BLD: 64 % (ref 44–72)
NRBC # BLD AUTO: 0 K/UL
NRBC BLD-RTO: 0 /100 WBC (ref 0–0.2)
PLATELET # BLD AUTO: 188 K/UL (ref 164–446)
PMV BLD AUTO: 10.2 FL (ref 9–12.9)
RBC # BLD AUTO: 4.86 M/UL (ref 4.7–6.1)
TESTOST SERPL-MCNC: 409 NG/DL (ref 175–781)
WBC # BLD AUTO: 8.5 K/UL (ref 4.8–10.8)

## 2025-02-21 PROCEDURE — 36415 COLL VENOUS BLD VENIPUNCTURE: CPT

## 2025-02-21 PROCEDURE — 84403 ASSAY OF TOTAL TESTOSTERONE: CPT

## 2025-02-21 PROCEDURE — 85025 COMPLETE CBC W/AUTO DIFF WBC: CPT

## 2025-02-24 DIAGNOSIS — R79.89 LOW TESTOSTERONE: ICD-10-CM

## 2025-02-24 RX ORDER — TESTOSTERONE 1.62 MG/G
40.5 GEL TRANSDERMAL DAILY
Qty: 75 G | Refills: 3 | Status: SHIPPED | OUTPATIENT
Start: 2025-02-24 | End: 2025-06-24

## 2025-02-25 ENCOUNTER — NON-PROVIDER VISIT (OUTPATIENT)
Dept: MEDICAL GROUP | Facility: PHYSICIAN GROUP | Age: 45
End: 2025-02-25
Payer: COMMERCIAL

## 2025-02-25 DIAGNOSIS — Z23 NEED FOR VACCINATION: ICD-10-CM

## 2025-02-25 PROCEDURE — 90746 HEPB VACCINE 3 DOSE ADULT IM: CPT | Mod: JZ | Performed by: NURSE PRACTITIONER

## 2025-02-25 PROCEDURE — 90471 IMMUNIZATION ADMIN: CPT | Performed by: NURSE PRACTITIONER

## 2025-02-25 NOTE — PROGRESS NOTES
"Ankush Ray is a 44 y.o. male here for a non-provider visit for:   HEPATITIS B 3 of 3    Reason for immunization: continue or complete series started at the office  Immunization records indicate need for vaccine: Yes, confirmed with Epic  Minimum interval has been met for this vaccine: Yes  ABN completed: Not Indicated    VIS Dated  5/12/2023 was given to patient: Yes  All IAC Questionnaire questions were answered \"No.\"    Patient tolerated injection and no adverse effects were observed or reported: Yes    Pt scheduled for next dose in series: Not Indicated  "

## 2025-03-03 ENCOUNTER — APPOINTMENT (OUTPATIENT)
Dept: MEDICAL GROUP | Facility: PHYSICIAN GROUP | Age: 45
End: 2025-03-03
Payer: COMMERCIAL

## 2025-03-04 ENCOUNTER — HOSPITAL ENCOUNTER (OUTPATIENT)
Dept: RADIOLOGY | Facility: MEDICAL CENTER | Age: 45
End: 2025-03-04
Attending: NURSE PRACTITIONER
Payer: COMMERCIAL

## 2025-03-04 ENCOUNTER — OFFICE VISIT (OUTPATIENT)
Dept: MEDICAL GROUP | Facility: PHYSICIAN GROUP | Age: 45
End: 2025-03-04
Payer: COMMERCIAL

## 2025-03-04 VITALS
OXYGEN SATURATION: 96 % | HEART RATE: 79 BPM | SYSTOLIC BLOOD PRESSURE: 116 MMHG | TEMPERATURE: 99.7 F | DIASTOLIC BLOOD PRESSURE: 60 MMHG | WEIGHT: 220 LBS | BODY MASS INDEX: 29.8 KG/M2 | HEIGHT: 72 IN

## 2025-03-04 DIAGNOSIS — R21 RASH IN ADULT: ICD-10-CM

## 2025-03-04 DIAGNOSIS — R05.3 CHRONIC COUGH: ICD-10-CM

## 2025-03-04 DIAGNOSIS — R00.0 TACHYCARDIA: ICD-10-CM

## 2025-03-04 PROCEDURE — 71046 X-RAY EXAM CHEST 2 VIEWS: CPT

## 2025-03-04 PROCEDURE — 3078F DIAST BP <80 MM HG: CPT | Performed by: NURSE PRACTITIONER

## 2025-03-04 PROCEDURE — 3074F SYST BP LT 130 MM HG: CPT | Performed by: NURSE PRACTITIONER

## 2025-03-04 PROCEDURE — 99214 OFFICE O/P EST MOD 30 MIN: CPT | Mod: 25 | Performed by: NURSE PRACTITIONER

## 2025-03-04 RX ORDER — TRIAMCINOLONE ACETONIDE 1 MG/G
1 CREAM TOPICAL 2 TIMES DAILY
Qty: 45 G | Refills: 0 | Status: SHIPPED | OUTPATIENT
Start: 2025-03-04 | End: 2025-03-18

## 2025-03-04 ASSESSMENT — PATIENT HEALTH QUESTIONNAIRE - PHQ9: CLINICAL INTERPRETATION OF PHQ2 SCORE: 0

## 2025-03-04 NOTE — PROGRESS NOTES
"Subjective:     CC: rash, heart problem, cough    HPI:   Ankush presents today with wife Danitza for the following:    Cough   When he coughs the cough is hard enough that it causes him to vomit. This is a daily occurrence. The cough is productive and phlegm can be different colors, bleeding. Coffee alleviates his cough. Drinking 1 gallon of water a day. Smokes marijuana 2 times a week and cigar once every 2 weeks. He states that his vision goes \"white\" and has to kneel or change position. Also noticed the vision changes when he stands too quickly or having BM. He has not loss consciousness. He is also noticed when he is coughing that his heart feels like it is fluttering.  His EKG on his watch showed HR of 125, no atrial fibrillation. He works around gases at work. No unexplained weight loss, night sweats, hemoptysis, chest pain or shortness of breath. He was taking Claritin 10 mg daily. Still had cough and cough with vomiting. He stopped Claritin due to low testosterone that resolved.    Rash   The rash does itch. Started 3 weeks ago. Rash is to left lower back on side. No burning, tingling, vesicles, discharge. Appears to be getting bigger. Used neosporin to area.       No past medical history on file.    Social History     Tobacco Use    Smoking status: Never    Smokeless tobacco: Never    Tobacco comments:     Cigar once every 2 wweeks   Vaping Use    Vaping status: Never Used   Substance Use Topics    Alcohol use: Not Currently    Drug use: Yes     Frequency: 2.0 times per week     Types: Marijuana, Inhaled     Comment: on weekends       Current Outpatient Medications Ordered in Epic   Medication Sig Dispense Refill    triamcinolone acetonide (KENALOG) 0.1 % Cream Apply 1 Application topically 2 times a day for 14 days. 45 g 0    Testosterone (ANDROGEL) 20.25 MG/ACT (1.62%) Gel Place 40.5 mg on the skin every day for 120 days. Two pumps daily 75 g 3    ibuprofen (MOTRIN) 200 MG Tab Take 200 mg by mouth every 6 " hours as needed.      multivitamin Tab Take 1 Tablet by mouth every day.      loratadine (CLARITIN) 10 MG Tab Take 10 mg by mouth every day. (Patient not taking: Reported on 3/4/2025)       No current [facility]-ordered facility-administered medications on file.       Allergies:  Patient has no known allergies.    Health Maintenance: Reviewed       Objective:     Vital signs reviewed  Exam:  /60 (BP Location: Left arm, Patient Position: Sitting, BP Cuff Size: Adult)   Pulse 79   Temp 37.6 °C (99.7 °F) (Temporal)   Ht 1.829 m (6')   Wt 99.8 kg (220 lb)   SpO2 96%   BMI 29.84 kg/m²  Body mass index is 29.84 kg/m².    Gen: Alert and oriented, No apparent distress.  Eyes:   Lids normal. Glasses in place.   Lungs: Normal effort, CTA bilaterally, no wheezes, rhonchi, or rales  CV: Regular rate and rhythm. No murmurs, rubs, or gallops.  Skin:    Left lateral flank with dry, red, papular rash without vesicles, grouping or central clearing      EKG Interpretation-HR is 64 unchanged from previous tracings, normal sinus rhythm. Compared to [facility] EKG 6/5/2024, scanned in media 6/13/2024.      Assessment & Plan:     44 y.o. male with the following -     1. Chronic cough  Chronic exacerbated problem.  Check chest x-ray and if x-ray clear consider CT chest.  Check Holter monitor for arrhythmias.  - DX-CHEST-2 VIEWS; Future  - Cardiac Event Monitor; Future    2. Tachycardia  Chronic exacerbated problem.  EKG in office unremarkable and similar to previous EKG reading.  Discussed checking Holter monitor to evaluate for any arrhythmias.  Check updated electrolytes and thyroid function.  - EKG  - Cardiac Event Monitor; Future  - Basic Metabolic Panel; Future  - TSH WITH REFLEX TO FT4; Future    3. Rash in adult  Acute uncomplicated problem.  Start triamcinolone twice daily for next 14 days.  Follow-up if symptoms worsen or not improving.  - triamcinolone acetonide (KENALOG) 0.1 % Cream; Apply 1 Application topically 2  times a day for 14 days.  Dispense: 45 g; Refill: 0      Return for pending results .    Please note that this dictation was created using voice recognition software. I have made every reasonable attempt to correct obvious errors, but I expect that there are errors of grammar and possibly content that I did not discover before finalizing the note.

## 2025-03-05 ENCOUNTER — RESULTS FOLLOW-UP (OUTPATIENT)
Dept: MEDICAL GROUP | Facility: PHYSICIAN GROUP | Age: 45
End: 2025-03-05
Payer: COMMERCIAL

## 2025-03-05 DIAGNOSIS — R05.3 CHRONIC COUGH: ICD-10-CM

## 2025-03-17 ENCOUNTER — HOSPITAL ENCOUNTER (OUTPATIENT)
Dept: RADIOLOGY | Facility: MEDICAL CENTER | Age: 45
End: 2025-03-17
Attending: NURSE PRACTITIONER
Payer: COMMERCIAL

## 2025-03-17 DIAGNOSIS — R05.3 CHRONIC COUGH: ICD-10-CM

## 2025-03-17 PROCEDURE — 700117 HCHG RX CONTRAST REV CODE 255: Performed by: NURSE PRACTITIONER

## 2025-03-17 PROCEDURE — 71260 CT THORAX DX C+: CPT

## 2025-03-17 RX ADMIN — IOHEXOL 75 ML: 350 INJECTION, SOLUTION INTRAVENOUS at 09:59

## 2025-03-20 ENCOUNTER — APPOINTMENT (OUTPATIENT)
Dept: CARDIOLOGY | Facility: MEDICAL CENTER | Age: 45
End: 2025-03-20
Attending: NURSE PRACTITIONER
Payer: COMMERCIAL

## 2025-03-20 ENCOUNTER — RESULTS FOLLOW-UP (OUTPATIENT)
Dept: MEDICAL GROUP | Facility: CLINIC | Age: 45
End: 2025-03-20

## 2025-03-20 DIAGNOSIS — R00.0 TACHYCARDIA: ICD-10-CM

## 2025-03-20 DIAGNOSIS — R05.3 CHRONIC COUGH: ICD-10-CM

## 2025-03-20 PROCEDURE — 93246 EXT ECG>7D<15D RECORDING: CPT

## 2025-03-20 NOTE — PROGRESS NOTES
Patient enrolled in the 14 day o Holter monitoring program per REID Iverson.  >Office hook-up, serial # [id number].  >Currently pending EOS.

## 2025-04-16 ENCOUNTER — TELEPHONE (OUTPATIENT)
Dept: CARDIOLOGY | Facility: MEDICAL CENTER | Age: 45
End: 2025-04-16
Payer: COMMERCIAL

## 2025-05-13 ENCOUNTER — OFFICE VISIT (OUTPATIENT)
Dept: MEDICAL GROUP | Facility: PHYSICIAN GROUP | Age: 45
End: 2025-05-13
Payer: COMMERCIAL

## 2025-05-13 VITALS
HEIGHT: 72 IN | TEMPERATURE: 98.4 F | DIASTOLIC BLOOD PRESSURE: 80 MMHG | WEIGHT: 221.12 LBS | HEART RATE: 62 BPM | OXYGEN SATURATION: 98 % | BODY MASS INDEX: 29.95 KG/M2 | SYSTOLIC BLOOD PRESSURE: 116 MMHG

## 2025-05-13 DIAGNOSIS — Z09 HOSPITAL DISCHARGE FOLLOW-UP: ICD-10-CM

## 2025-05-13 DIAGNOSIS — G51.0 BELL'S PALSY: ICD-10-CM

## 2025-05-13 PROCEDURE — 3074F SYST BP LT 130 MM HG: CPT | Performed by: NURSE PRACTITIONER

## 2025-05-13 PROCEDURE — 99213 OFFICE O/P EST LOW 20 MIN: CPT | Performed by: NURSE PRACTITIONER

## 2025-05-13 PROCEDURE — 3079F DIAST BP 80-89 MM HG: CPT | Performed by: NURSE PRACTITIONER

## 2025-05-13 RX ORDER — PREDNISONE 20 MG/1
20 TABLET ORAL DAILY
COMMUNITY

## 2025-05-13 RX ORDER — VALACYCLOVIR HYDROCHLORIDE 1 G/1
1000 TABLET, FILM COATED ORAL 2 TIMES DAILY
COMMUNITY

## 2025-05-13 NOTE — PROGRESS NOTES
Subjective:     CC: Facial numbness, Hospital follow-up    HPI:   Ankush presents today with his wife for  the following:    Hospital discharge follow-up  He was having left sided facial tingling/numbness. Took an allergy pill. By 6 pm that evening had left facial drooping. He was seen at [facility] in [place] 5/11/2025 and diagnosed with [place] Palsy. He reports had labs and EKG done. He was prescribed prednisone and valcyclovir for 7 day course. He was told to follow-up with PCP. He has daily eye drops and nightly eye drops. Has intermittent worsening of drooping or slurred speech.       No past medical history on file.    Social History     Tobacco Use    Smoking status: Never    Smokeless tobacco: Never    Tobacco comments:     Cigar once every 2 wweeks   Vaping Use    Vaping status: Never Used   Substance Use Topics    Alcohol use: Not Currently    Drug use: Yes     Frequency: 2.0 times per week     Types: Marijuana, Inhaled     Comment: on weekends       Current Outpatient Medications Ordered in Epic   Medication Sig Dispense Refill    predniSONE (DELTASONE) 20 MG Tab Take 20 mg by mouth every day. 3 TABLETS BY MOUTH ONCE A DAY FOR 7 DAYS      valacyclovir (VALTREX) 1 GM Tab Take 1,000 mg by mouth 2 times a day. 1 TABLET BY MOUTH TID FOR 7 DAYS      Testosterone (ANDROGEL) 20.25 MG/ACT (1.62%) Gel Place 40.5 mg on the skin every day for 120 days. Two pumps daily 75 g 3    loratadine (CLARITIN) 10 MG Tab Take 10 mg by mouth every day.      ibuprofen (MOTRIN) 200 MG Tab Take 200 mg by mouth every 6 hours as needed.      multivitamin Tab Take 1 Tablet by mouth every day.       No current [facility]-ordered facility-administered medications on file.       Allergies:  Patient has no known allergies.    Health Maintenance: Completed      Objective:     Vital signs reviewed  Exam:  /80 (BP Location: Left arm, Patient Position: Sitting, BP Cuff Size: Adult)   Pulse 62   Temp 36.9 °C (98.4 °F) (Temporal)   Ht 1.829 m  (6')   Wt 100 kg (221 lb 1.9 oz)   SpO2 98%   BMI 29.99 kg/m²  Body mass index is 29.99 kg/m².      General: Normal appearing. No distress.  HENT: Normocephalic. Ears normal shape and contour, canals are clear bilaterally, tympanic membranes are benign, left nasal turbinate with swelling, right nasal turbinate, oropharynx is without erythema, edema or exudates.   Eyes: Pupils equal and reactive to light accommodation, right eyelid normal.  Left eyelid with ptosis, right eyelid normal.   Pulmonary: Clear to ausculation.  Normal effort. No rales, rhonchi, or wheezing.  Cardiovascular: Regular rate and rhythm without murmur.   Neurologic: CN VII abnormal, left facial drooping with partial left eye closure.  Cranial nerves II through XII intact.  Lymph: No cervical or supraclavicular lymph nodes are palpable  Skin: Warm and dry.  No obvious lesions.  Musculoskeletal: Normal gait. No extremity cyanosis, clubbing, or edema.  Upper extremity strength equal 5/5, lower extremity strength equal 5/5 and  strength equal 5/5  Psych: Normal mood and affect. Alert and oriented x3. Judgment and insight is normal.    Assessment & Plan:     45 y.o. male with the following -     1. Hospital discharge follow-up  Acute uncomplicated problem.  History from the recent ER visit provided by patient and wife.  We are requesting records from [employer].    2. Bell's palsy  Acute uncomplicated problem.  Discussed to finish 7-day course of prednisone and valacyclovir.  For his eye continue with eye moisturizing eyedrops and nightly moisturization lubricant.  May use eye patch intermittently for eye protection.      Return if symptoms worsen or fail to improve.    Please note that this dictation was created using voice recognition software. I have made every reasonable attempt to correct obvious errors, but I expect that there are errors of grammar and possibly content that I did not discover before finalizing the note.

## 2025-05-13 NOTE — LETTER
[facility]  RADAMES Bloom  [address]  [facility] [address]  Fax: [phone]   Authorization for Release/Disclosure of   Protected Health Information   Name: KWADWO RAY : 1980 SSN: [id number]   Address: [address] [place] [address] Phone:    [phone] (home)    I authorize the entity listed below to release/disclose the PHI below to:   [facility]/RADAMES Bloom and RADAMES Bloom   Provider or Entity Name:  [facility] [place] [address]   Address   City, State, Zip   Phone:      Fax:     Reason for request: continuity of care   Information to be released:    [  ] LAST COLONOSCOPY,  including any PATH REPORT and follow-up  [  ] LAST FIT/COLOGUARD RESULT [  ] LAST DEXA  [  ] LAST MAMMOGRAM  [  ] LAST PAP  [  ] LAST LABS [  ] RETINA EXAM REPORT  [  ] IMMUNIZATION RECORDS  [xx] Release all info from last ER VISIT       [  ] Check here and initial the line next to each item to release ALL health information INCLUDING  _____ Care and treatment for drug and / or alcohol abuse  _____ HIV testing, infection status, or AIDS  _____ Genetic Testing    DATES OF SERVICE OR TIME PERIOD TO BE DISCLOSED: _____________  I understand and acknowledge that:  * This Authorization may be revoked at any time by you in writing, except if your health information has already been used or disclosed.  * Your health information that will be used or disclosed as a result of you signing this authorization could be re-disclosed by the recipient. If this occurs, your re-disclosed health information may no longer be protected by State or Federal laws.  * You may refuse to sign this Authorization. Your refusal will not affect your ability to obtain treatment.  * This Authorization becomes effective upon signing and will  on (date) __________.      If no date is indicated, this Authorization will  one (1) year from the signature date.    Name: Kwadwo Ray  Signature:  Date:   5/13/2025     PLEASE FAX REQUESTED RECORDS BACK TO: [phone]

## 2025-05-21 ENCOUNTER — OFFICE VISIT (OUTPATIENT)
Dept: UROLOGY | Facility: MEDICAL CENTER | Age: 45
End: 2025-05-21
Payer: COMMERCIAL

## 2025-05-21 DIAGNOSIS — R79.89 LOW TESTOSTERONE: Primary | ICD-10-CM

## 2025-05-21 PROCEDURE — 99213 OFFICE O/P EST LOW 20 MIN: CPT | Performed by: STUDENT IN AN ORGANIZED HEALTH CARE EDUCATION/TRAINING PROGRAM

## 2025-05-21 RX ORDER — TESTOSTERONE 1.62 MG/G
40.5 GEL TRANSDERMAL DAILY
Qty: 75 G | Refills: 5 | Status: SHIPPED | OUTPATIENT
Start: 2025-05-21 | End: 2025-11-17

## 2025-05-21 NOTE — PROGRESS NOTES
Subjective  CHIEF COMPLAINT:    Presents to the office today to discuss:    1. Testosterone therapy follow-up    PAST UROLOGICAL HISTORY:    Patient has been on testosterone therapy with levels maintained within the treatment target range. Previous labs have shown acceptable results. No prior urological surgeries or interventions noted.    HPI TODAY 05/21/2025:    - Testosterone therapy continues with good subjective response. Patient reports feeling well overall.  - Recent labs show testosterone levels at the lower edge of the treatment target range.  - No issues with energy, appetite reported. Patient notes good energy levels throughout most of the day with some fatigue in evenings.  - Reports good libido.  - Denies swelling or other side effects from testosterone therapy.  - Reports unrelated Bell's palsy, which patient believes is not connected to testosterone therapy.    Family History   Problem Relation Age of Onset    Other Mother         blood clot    Obesity Father     No Known Problems Sister     Asthma Brother     No Known Problems Daughter        Social History     Socioeconomic History    Marital status:      Spouse name: Not on file    Number of children: Not on file    Years of education: Not on file    Highest education level: Not on file   Occupational History    Not on file   Tobacco Use    Smoking status: Never    Smokeless tobacco: Never    Tobacco comments:     Cigar once every 2 wweeks   Vaping Use    Vaping status: Never Used   Substance and Sexual Activity    Alcohol use: Not Currently    Drug use: Yes     Frequency: 2.0 times per week     Types: Marijuana, Inhaled     Comment: on weekends    Sexual activity: Yes     Partners: Female     Comment: wife-ablation   Other Topics Concern    Not on file   Social History Narrative    Not on file     Social Drivers of Health     Financial Resource Strain: Not on file   Food Insecurity: Not on file   Transportation Needs: Not on file   Physical  Activity: Not on file   Stress: Not on file   Social Connections: Not on file   Intimate Partner Violence: Not on file   Housing Stability: Not on file       Past Surgical History:   Procedure Laterality Date    OTHER  2014    tailbone cyst removal       No past medical history on file.    Current Outpatient Medications   Medication Sig Dispense Refill    predniSONE (DELTASONE) 20 MG Tab Take 20 mg by mouth every day. 3 TABLETS BY MOUTH ONCE A DAY FOR 7 DAYS      valacyclovir (VALTREX) 1 GM Tab Take 1,000 mg by mouth 2 times a day. 1 TABLET BY MOUTH TID FOR 7 DAYS      Testosterone (ANDROGEL) 20.25 MG/ACT (1.62%) Gel Place 40.5 mg on the skin every day for 120 days. Two pumps daily 75 g 3    loratadine (CLARITIN) 10 MG Tab Take 10 mg by mouth every day.      ibuprofen (MOTRIN) 200 MG Tab Take 200 mg by mouth every 6 hours as needed.      multivitamin Tab Take 1 Tablet by mouth every day.       No current facility-administered medications for this visit.       No Known Allergies    Objective  There were no vitals taken for this visit.  Physical Exam  Constitutional:       Appearance: Normal appearance.   HENT:      Head: Normocephalic and atraumatic.   Pulmonary:      Effort: Pulmonary effort is normal.   Skin:     General: Skin is warm and dry.   Neurological:      General: No focal deficit present.      Mental Status: He is alert.   Psychiatric:         Mood and Affect: Mood normal.         Behavior: Behavior normal.         Labs:   CBC   Lab Results   Component Value Date/Time    WBC 8.5 02/21/2025 0848    RBC 4.86 02/21/2025 0848    HEMOGLOBIN 15.3 02/21/2025 0848    HEMATOCRIT 45.7 02/21/2025 0848    MCV 94.0 02/21/2025 0848    MCH 31.5 02/21/2025 0848    MCHC 33.5 02/21/2025 0848    RDW 43.0 02/21/2025 0848    MPV 10.2 02/21/2025 0848    LYMPHOCYTES 24.40 02/21/2025 0848    LYMPHS 2.06 02/21/2025 0848    MONOCYTES 7.90 02/21/2025 0848    MONOS 0.67 02/21/2025 0848    EOSINOPHILS 3.00 02/21/2025 0848    EOS 0.25  02/21/2025 0848    BASOPHILS 0.60 02/21/2025 0848    BASO 0.05 02/21/2025 0848    NRBC 0.00 02/21/2025 0848         Lab Results   Component Value Date/Time    FSH 4.1 09/27/2024 0820    TESTOSTERONE 409 02/21/2025 0848    FREETESTOST 59 08/14/2024 0828    SEXHORM 20 08/14/2024 0828         Imaging: none    Assessment    1. Testosterone deficiency (E29.1)    - Assessment: Currently well-controlled on present dosage. Testosterone levels at lower end of target range but clinically effective.  - Plan: Continue current testosterone regimen Androgel 2 pumps per day. Discussed option of alternating between 2 and 3 pumps daily if patient desires slightly higher levels, but agreed to maintain current dosage as patient is satisfied with results. Will refill medication for 6-month supply.  - Counseling: Discussed that testosterone levels can fluctuate based on factors such as skin moisture, recent showering, and daily activity. Current approach of using lowest effective dose minimizes potential side effects while maintaining clinical benefit.    Plan    Problem List Items Addressed This Visit    None  ORDERS:    Laboratory testing in 6 months to reassess testosterone levels.    FOLLOW UP:    Return to clinic in 6 months with laboratory testing prior to appointment.    SHORT SUMMARY:    Gentleman on testosterone replacement therapy with levels at lower end of target range but experiencing good clinical response. Continuing current regimen with follow-up and labs in 6 months.

## 2025-07-28 ENCOUNTER — APPOINTMENT (OUTPATIENT)
Dept: UROLOGY | Facility: MEDICAL CENTER | Age: 45
End: 2025-07-28
Payer: COMMERCIAL

## 2025-07-29 ENCOUNTER — HOSPITAL ENCOUNTER (OUTPATIENT)
Dept: LAB | Facility: MEDICAL CENTER | Age: 45
End: 2025-07-29
Attending: STUDENT IN AN ORGANIZED HEALTH CARE EDUCATION/TRAINING PROGRAM
Payer: COMMERCIAL

## 2025-07-29 DIAGNOSIS — R79.89 LOW TESTOSTERONE: ICD-10-CM

## 2025-07-29 LAB
BASOPHILS # BLD AUTO: 0 % (ref 0–1.8)
BASOPHILS # BLD: 0 K/UL (ref 0–0.12)
EOSINOPHIL # BLD AUTO: 0.21 K/UL (ref 0–0.51)
EOSINOPHIL NFR BLD: 2.6 % (ref 0–6.9)
ERYTHROCYTE [DISTWIDTH] IN BLOOD BY AUTOMATED COUNT: 41.9 FL (ref 35.9–50)
HCT VFR BLD AUTO: 44 % (ref 42–52)
HGB BLD-MCNC: 15.5 G/DL (ref 14–18)
LYMPHOCYTES # BLD AUTO: 2.72 K/UL (ref 1–4.8)
LYMPHOCYTES NFR BLD: 33.6 % (ref 22–41)
MANUAL DIFF BLD: NORMAL
MCH RBC QN AUTO: 31.3 PG (ref 27–33)
MCHC RBC AUTO-ENTMCNC: 35.2 G/DL (ref 32.3–36.5)
MCV RBC AUTO: 88.7 FL (ref 81.4–97.8)
MONOCYTES # BLD AUTO: 0.7 K/UL (ref 0–0.85)
MONOCYTES NFR BLD AUTO: 8.6 % (ref 0–13.4)
MORPHOLOGY BLD-IMP: NORMAL
NEUTROPHILS # BLD AUTO: 4.47 K/UL (ref 1.82–7.42)
NEUTROPHILS NFR BLD: 55.2 % (ref 44–72)
NRBC # BLD AUTO: 0 K/UL
NRBC BLD-RTO: 0 /100 WBC (ref 0–0.2)
PLATELET # BLD AUTO: 202 K/UL (ref 164–446)
PLATELET BLD QL SMEAR: NORMAL
PMV BLD AUTO: 9.6 FL (ref 9–12.9)
RBC # BLD AUTO: 4.96 M/UL (ref 4.7–6.1)
RBC BLD AUTO: PRESENT
TESTOST SERPL-MCNC: 451 NG/DL (ref 175–781)
VARIANT LYMPHS BLD QL SMEAR: NORMAL
WBC # BLD AUTO: 8.1 K/UL (ref 4.8–10.8)
WBC TOXIC VACUOLES BLD QL SMEAR: NORMAL

## 2025-07-29 PROCEDURE — 85027 COMPLETE CBC AUTOMATED: CPT

## 2025-07-29 PROCEDURE — 36415 COLL VENOUS BLD VENIPUNCTURE: CPT

## 2025-07-29 PROCEDURE — 84403 ASSAY OF TOTAL TESTOSTERONE: CPT

## 2025-07-29 PROCEDURE — 85007 BL SMEAR W/DIFF WBC COUNT: CPT

## 2025-08-12 DIAGNOSIS — R79.89 LOW TESTOSTERONE: ICD-10-CM

## 2025-08-13 RX ORDER — TESTOSTERONE 1.62 MG/G
40.5 GEL TRANSDERMAL DAILY
Qty: 75 G | Refills: 5 | Status: SHIPPED | OUTPATIENT
Start: 2025-08-13 | End: 2026-02-09

## 2025-08-18 ENCOUNTER — APPOINTMENT (OUTPATIENT)
Dept: UROLOGY | Facility: MEDICAL CENTER | Age: 45
End: 2025-08-18
Payer: COMMERCIAL